# Patient Record
Sex: FEMALE | Race: WHITE
[De-identification: names, ages, dates, MRNs, and addresses within clinical notes are randomized per-mention and may not be internally consistent; named-entity substitution may affect disease eponyms.]

---

## 2018-05-28 ENCOUNTER — HOSPITAL ENCOUNTER (EMERGENCY)
Dept: HOSPITAL 59 - ER | Age: 28
Discharge: HOME | End: 2018-05-28
Payer: MEDICAID

## 2018-05-28 DIAGNOSIS — F17.210: ICD-10-CM

## 2018-05-28 DIAGNOSIS — I25.2: ICD-10-CM
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
## 2018-05-28 DIAGNOSIS — S49.91XA: Primary | ICD-10-CM

## 2018-05-28 PROCEDURE — 99283 EMERGENCY DEPT VISIT LOW MDM: CPT
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
## 2018-05-30 NOTE — RADIOLOGY REPORT
EXAM:  RIGHT SHOULDER



HISTORY:  SHOULDER PAIN.  



TECHNIQUE:  Three views of the right shoulder were obtained.  



Comparison:  None.  



FINDINGS:  Negative for fracture or dislocation.  The soft tissues are 
unremarkable.  The joint spaces are preserved.  



IMPRESSION:  

NEGATIVE RIGHT SHOULDER EXAMINATION.  



JOB NUMBER:  [id number]
MTDD